# Patient Record
Sex: FEMALE | Race: WHITE | NOT HISPANIC OR LATINO | Employment: STUDENT | URBAN - METROPOLITAN AREA
[De-identification: names, ages, dates, MRNs, and addresses within clinical notes are randomized per-mention and may not be internally consistent; named-entity substitution may affect disease eponyms.]

---

## 2017-01-22 ENCOUNTER — OFFICE VISIT (OUTPATIENT)
Dept: URGENT CARE | Facility: CLINIC | Age: 22
End: 2017-01-22
Payer: COMMERCIAL

## 2017-01-22 VITALS
RESPIRATION RATE: 16 BRPM | OXYGEN SATURATION: 95 % | BODY MASS INDEX: 20.83 KG/M2 | SYSTOLIC BLOOD PRESSURE: 102 MMHG | HEART RATE: 96 BPM | WEIGHT: 125 LBS | TEMPERATURE: 97.6 F | DIASTOLIC BLOOD PRESSURE: 60 MMHG | HEIGHT: 65 IN

## 2017-01-22 DIAGNOSIS — J03.90 TONSILLITIS: ICD-10-CM

## 2017-01-22 DIAGNOSIS — J40 BRONCHITIS: ICD-10-CM

## 2017-01-22 LAB
INT CON NEG: NEGATIVE
INT CON POS: POSITIVE
S PYO AG THROAT QL: POSITIVE

## 2017-01-22 PROCEDURE — 99203 OFFICE O/P NEW LOW 30 MIN: CPT | Performed by: PHYSICIAN ASSISTANT

## 2017-01-22 PROCEDURE — 87880 STREP A ASSAY W/OPTIC: CPT | Performed by: PHYSICIAN ASSISTANT

## 2017-01-22 RX ORDER — CEFUROXIME AXETIL 500 MG/1
500 TABLET ORAL 2 TIMES DAILY
Qty: 20 TAB | Refills: 0 | Status: SHIPPED | OUTPATIENT
Start: 2017-01-22 | End: 2017-02-01

## 2017-01-22 RX ORDER — ESCITALOPRAM OXALATE 20 MG/1
20 TABLET ORAL DAILY
COMMUNITY
End: 2018-01-03 | Stop reason: SDUPTHER

## 2017-01-22 ASSESSMENT — ENCOUNTER SYMPTOMS
SORE THROAT: 1
SHORTNESS OF BREATH: 0
RHINORRHEA: 1
FEVER: 1
COUGH: 1
SPUTUM PRODUCTION: 1
EYES NEGATIVE: 1
CARDIOVASCULAR NEGATIVE: 1

## 2017-01-22 NOTE — MR AVS SNAPSHOT
"Chiqui Hardy   2017 9:30 AM   Office Visit   MRN: [id number]    Department:  [facility]   Dept Phone:  [phone]    Description:  Female : 1995   Provider:  Roscoe Cooley PA-C           Reason for Visit     Pharyngitis     Cough           Allergies as of 2017     No Known Allergies      You were diagnosed with     Tonsillitis   [018356]       Bronchitis   [010173]         Vital Signs     Blood Pressure Pulse Temperature Respirations Height Weight    102/60 mmHg 96 36.4 °C (97.6 °F) 16 1.651 m (5' 5\") 56.7 kg (125 lb)    Body Mass Index Oxygen Saturation Breastfeeding?             20.80 kg/m2 95% No         Basic Information     Date Of Birth Sex Race Ethnicity Preferred Language    1995 Female White Non- English      Health Maintenance     Patient has no pending health maintenance at this time      Results     POCT Rapid Strep A      Component    Rapid Strep Screen    Positive    Internal Control Positive    Positive    Internal Control Negative    Negative                        Current Immunizations     No immunizations on file.      Below and/or attached are the medications your provider expects you to take. Review all of your home medications and newly ordered medications with your provider and/or pharmacist. Follow medication instructions as directed by your provider and/or pharmacist. Please keep your medication list with you and share with your provider. Update the information when medications are discontinued, doses are changed, or new medications (including over-the-counter products) are added; and carry medication information at all times in the event of emergency situations     Allergies:  No Known Allergies          Medications  Valid as of: 2017 - 10:06 AM    Generic Name Brand Name Tablet Size Instructions for use    Cefuroxime Axetil (Tab) CEFTIN 500 MG Take 1 Tab by mouth 2 times a day for 10 days.        Escitalopram Oxalate (Tab) " LEXAPRO 20 MG Take 20 mg by mouth every day.        .                 Medicines prescribed today were sent to:     [id number]  [place], [place] - [address] AT [facility] [address]    [address] [place] [address]    Phone: [phone] Fax: [phone]    Open 24 Hours?: No      Medication refill instructions:       If your prescription bottle indicates you have medication refills left, it is not necessary to call your provider’s office. Please contact your pharmacy and they will refill your medication.    If your prescription bottle indicates you do not have any refills left, you may request refills at any time through one of the following ways: The online Friday system (except Urgent Care), by calling your provider’s office, or by asking your pharmacy to contact your provider’s office with a refill request. Medication refills are processed only during regular business hours and may not be available until the next business day. Your provider may request additional information or to have a follow-up visit with you prior to refilling your medication.   *Please Note: Medication refills are assigned a new Rx number when refilled electronically. Your pharmacy may indicate that no refills were authorized even though a new prescription for the same medication is available at the pharmacy. Please request the medicine by name with the pharmacy before contacting your provider for a refill.           Friday Access Code: [email or web address]  Expires: 2/21/2017  9:25 AM    Friday  A secure, online tool to manage your health information     [employer]’s Friday® is a secure, online tool that connects you to your personalized health information from the privacy of your home -- day or night - making it very easy for you to manage your healthcare. Once the activation process is completed, you can even access your medical information using the Friday thor, which is available for free in the  Apple Markie store or Google Play store.     Power[employer]ine provides the following levels of access (as shown below):   My Chart Features   [facility] Doctor [facility]  Specialists [facility]  Urgent  Care Non-[facility]  Primary Care  Doctor   Email your healthcare team securely and privately 24/7 X X X    Manage appointments: schedule your next appointment; view details of past/upcoming appointments X      Request prescription refills. X      View recent personal medical records, including lab and immunizations X X X X   View health record, including health history, allergies, medications X X X X   Read reports about your outpatient visits, procedures, consult and ER notes X X X X   See your discharge summary, which is a recap of your hospital and/or ER visit that includes your diagnosis, lab results, and care plan. X X       How to register for PowerDsine:  1. Go to  [email or web address].  2. Click on the Sign Up Now box, which takes you to the New Member Sign Up page. You will need to provide the following information:  a. Enter your PowerDsine Access Code exactly as it appears at the top of this page. (You will not need to use this code after you’ve completed the sign-up process. If you do not sign up before the expiration date, you must request a new code.)   b. Enter your date of birth.   c. Enter your home email address.   d. Click Submit, and follow the next screen’s instructions.  3. Create a PowerDsine ID. This will be your PowerDsine login ID and cannot be changed, so think of one that is secure and easy to remember.  4. Create a PowerDsine password. You can change your password at any time.  5. Enter your Password Reset Question and Answer. This can be used at a later time if you forget your password.   6. Enter your e-mail address. This allows you to receive e-mail notifications when new information is available in PowerDsine.  7. Click Sign Up. You can now view your health information.    For assistance activating your  MyChart account, call [phone]

## 2017-01-22 NOTE — PROGRESS NOTES
"Subjective:      Chiqui Hardy is a 21 y.o. female who presents with No chief complaint on file.            Cough  This is a new (fever, cough, st) problem. The current episode started in the past 7 days. The problem has been unchanged. The problem occurs every few minutes. The cough is productive of sputum. Associated symptoms include a fever, nasal congestion, rhinorrhea and a sore throat. Pertinent negatives include no shortness of breath. Nothing aggravates the symptoms. She has tried nothing for the symptoms. The treatment provided no relief. There is no history of asthma or environmental allergies.       Review of Systems   Constitutional: Positive for fever.   HENT: Positive for congestion, rhinorrhea and sore throat.    Eyes: Negative.    Respiratory: Positive for cough and sputum production. Negative for shortness of breath.    Cardiovascular: Negative.    Skin: Negative.    Endo/Heme/Allergies: Negative for environmental allergies.          Objective:     /60 mmHg  Pulse 96  Temp(Src) 36.4 °C (97.6 °F)  Resp 16  Ht 1.651 m (5' 5\")  Wt 56.7 kg (125 lb)  BMI 20.80 kg/m2  SpO2 95%  Breastfeeding? No     Physical Exam   Constitutional: She is oriented to person, place, and time. She appears well-developed and well-nourished. No distress.   HENT:   Head: Normocephalic and atraumatic.   Mouth/Throat: No oropharyngeal exudate (+tons redn).   Eyes: EOM are normal. Pupils are equal, round, and reactive to light.   Neck: Normal range of motion. Neck supple.   Cardiovascular: Normal rate.    Pulmonary/Chest: Effort normal and breath sounds normal. No respiratory distress. She has no wheezes. She has no rales.   Lymphadenopathy:     She has cervical adenopathy.   Neurological: She is alert and oriented to person, place, and time.   Skin: Skin is warm and dry.   Psychiatric: She has a normal mood and affect. Her behavior is normal. Judgment and thought content normal.   Nursing note and vitals " "reviewed.    Filed Vitals:    01/22/17 0953   BP: 102/60   Pulse: 96   Temp: 36.4 °C (97.6 °F)   Resp: 16   Height: 1.651 m (5' 5\")   Weight: 56.7 kg (125 lb)   SpO2: 95%     Active Ambulatory Problems     Diagnosis Date Noted   • No Active Ambulatory Problems     Resolved Ambulatory Problems     Diagnosis Date Noted   • No Resolved Ambulatory Problems     No Additional Past Medical History     No current outpatient prescriptions on file prior to visit.     No current facility-administered medications on file prior to visit.     Gargles, Cepacol lozenges, Aleve/Advil as needed for throat pain  History reviewed. No pertinent family history.  Review of patient's allergies indicates no known allergies.         Rst+     Assessment/Plan:     ·  tonsillitis, bronchitis      Gargles, Cepacol lozenges, Aleve/Advil as needed for throat pain; rx abx; Return to clinic 3-5 days if symptoms persist or worsen or follow up with Primary Doctor      "

## 2018-01-03 ENCOUNTER — OFFICE VISIT (OUTPATIENT)
Dept: MEDICAL GROUP | Facility: MEDICAL CENTER | Age: 23
End: 2018-01-03
Payer: COMMERCIAL

## 2018-01-03 VITALS
RESPIRATION RATE: 16 BRPM | WEIGHT: 124 LBS | TEMPERATURE: 98.6 F | HEIGHT: 65 IN | OXYGEN SATURATION: 98 % | BODY MASS INDEX: 20.66 KG/M2 | SYSTOLIC BLOOD PRESSURE: 104 MMHG | HEART RATE: 76 BPM | DIASTOLIC BLOOD PRESSURE: 64 MMHG

## 2018-01-03 DIAGNOSIS — F90.2 ATTENTION DEFICIT HYPERACTIVITY DISORDER (ADHD), COMBINED TYPE: ICD-10-CM

## 2018-01-03 DIAGNOSIS — Z00.00 PREVENTATIVE HEALTH CARE: ICD-10-CM

## 2018-01-03 DIAGNOSIS — F90.2 ATTENTION DEFICIT HYPERACTIVITY DISORDER (ADHD), COMBINED TYPE: Primary | ICD-10-CM

## 2018-01-03 DIAGNOSIS — B00.9 HSV INFECTION: ICD-10-CM

## 2018-01-03 DIAGNOSIS — L70.0 ACNE VULGARIS: ICD-10-CM

## 2018-01-03 DIAGNOSIS — F41.9 ANXIETY: ICD-10-CM

## 2018-01-03 PROCEDURE — 99214 OFFICE O/P EST MOD 30 MIN: CPT | Performed by: FAMILY MEDICINE

## 2018-01-03 RX ORDER — ESCITALOPRAM OXALATE 20 MG/1
30 TABLET ORAL DAILY
Qty: 90 TAB | Refills: 3 | Status: SHIPPED | OUTPATIENT
Start: 2018-01-03 | End: 2018-08-29 | Stop reason: SDUPTHER

## 2018-01-03 RX ORDER — DEXTROAMPHETAMINE SACCHARATE, AMPHETAMINE ASPARTATE, DEXTROAMPHETAMINE SULFATE AND AMPHETAMINE SULFATE 2.5; 2.5; 2.5; 2.5 MG/1; MG/1; MG/1; MG/1
TABLET ORAL
Qty: 60 TAB | Refills: 0 | Status: SHIPPED | OUTPATIENT
Start: 2018-01-03 | End: 2018-01-03 | Stop reason: SDUPTHER

## 2018-01-03 RX ORDER — ACYCLOVIR 400 MG/1
TABLET ORAL
Qty: 30 TAB | Status: SHIPPED | DISCHARGE
Start: 2018-01-03 | End: 2018-08-29 | Stop reason: SDUPTHER

## 2018-01-03 RX ORDER — DEXTROAMPHETAMINE SACCHARATE, AMPHETAMINE ASPARTATE, DEXTROAMPHETAMINE SULFATE AND AMPHETAMINE SULFATE 2.5; 2.5; 2.5; 2.5 MG/1; MG/1; MG/1; MG/1
TABLET ORAL
Qty: 60 TAB | Refills: 0 | Status: SHIPPED | OUTPATIENT
Start: 2018-01-03 | End: 2018-04-19 | Stop reason: SDUPTHER

## 2018-01-03 RX ORDER — SPIRONOLACTONE 25 MG/1
25 TABLET ORAL DAILY
COMMUNITY

## 2018-01-03 ASSESSMENT — PATIENT HEALTH QUESTIONNAIRE - PHQ9: CLINICAL INTERPRETATION OF PHQ2 SCORE: 0

## 2018-01-03 NOTE — ASSESSMENT & PLAN NOTE
"Was dx in elementary school and then re-diagnosed by her school counselor  She actually saw a specialist (psychiatrist) who offered Wellbutrin and adderall but wants second opinion by primary care.  We discuss multiple treatment options and ultimately we decide best first step is to do trial of stimulent medication.  Will start low dose per her request.  We discuss risk/benefit.  We discuss drug holiday.  She will likely only use adderall on Monday Wednesday (her 8hour + class days) and take \"holiday\" during her clinicals (where she does not experience symptoms or dysfunction).      Follow up visit for next controlled substance refill  She can take advantage of our telemedicine appointments  Follow up prn for change in medications, or to follow up on how this plan is working for her  We can consider more frequent counseling and we can consider wellbutrin augmentation in the future                "

## 2018-01-03 NOTE — ASSESSMENT & PLAN NOTE
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

## 2018-01-03 NOTE — PROGRESS NOTES
This medical record contains text that has been entered with the assistance of computer voice recognition and dictation software.  Therefore, it may contain unintended errors in text, spelling, punctuation, or grammar        Chief Complaint   Patient presents with   • Establish Care   • Other     general labs ordered and refill on medications       Chiqui Hardy is a 22 y.o. 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     • spironolactone (ALDACTONE) 25 MG Tab Take 25 mg by mouth every day.     • escitalopram (LEXAPRO) 20 MG tablet Take 1.5 Tabs by mouth every day. 90 Tab 3   • acyclovir (ZOVIRAX) 400 MG tablet Take 1 tablet 3x daily for 5 days as needed for HSV outbreak 30 Tab    • amphetamine-dextroamphetamine (ADDERALL) 10 MG Tab Take 1 tab twice daily (morning and early afternoon) daily for ADHD.  I recommend that you take weekend or weekday holiday at least 2 days per week. 60 Tab 0     No current facility-administered medications for this visit.      Patient Active Problem List    Diagnosis Date Noted   • Anxiety 01/03/2018   • Attention deficit hyperactivity disorder (ADHD), combined type 01/03/2018   • HSV infection 01/03/2018     Past Surgical History:   Procedure Laterality Date   • OTHER  2006    Spinal Tap DX: Spinal infection      Social History   Substance Use Topics   • Smoking status: Never Smoker   •  "Smokeless tobacco: Never Used   • Alcohol use 1.8 oz/week     3 Glasses of wine per week     Family History   Problem Relation Age of Onset   • Other Mother      Hypotension   • Hyperlipidemia Mother    • Hyperlipidemia Father    • Hypertension Father            ROS    See HPI  No lesions labial  + ADHD + stress/anxieity school related   All other systems reviewed and are negative     Objective:     Blood pressure 104/64, pulse 76, temperature 37 °C (98.6 °F), resp. rate 16, height 1.651 m (5' 5\"), weight 56.2 kg (124 lb), SpO2 98 %. Body mass index is 20.63 kg/m².  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  We discuss multiple treatment options and ultimately we decide best first step is to do trial of stimulent medication.  Will start low dose per her request.  We discuss risk/benefit.  We discuss drug holiday.  She will likely only use adderall on Monday " "Wednesday (her 8hour + class days) and take \"holiday\" during her clinicals (where she does not experience symptoms or dysfunction).      Follow up visit for next controlled substance refill  She can take advantage of our telemedicine appointments  Follow up prn for change in medications, or to follow up on how this plan is working for her  We can consider more frequent counseling and we can consider wellbutrin augmentation in the future                       Relevant Medications    amphetamine-dextroamphetamine (ADDERALL) 10 MG Tab    HSV infection     History only  All questions answered  acyclovir sent for in case of recurrence symptoms               Relevant Medications    acyclovir (ZOVIRAX) 400 MG tablet      Other Visit Diagnoses     Acne vulgaris        Preventative health care        Relevant Orders    HEMOGLOBIN A1C            Over 60 minutes spent with patient face to face, greater than 50% time spent with plan/cordination of care as above in my A/P.      Instructed to follow up if symptoms worsen or fail to improve, ER/UC precautions discussed as well    Adelia Macedo MD  [facility], Family Medicine   [address] [place] [address] [address]kw[address]   [place] [address]  Phone: [phone]             "

## 2018-04-16 ENCOUNTER — APPOINTMENT (OUTPATIENT)
Dept: RADIOLOGY | Facility: MEDICAL CENTER | Age: 23
End: 2018-04-16
Attending: EMERGENCY MEDICINE
Payer: COMMERCIAL

## 2018-04-16 ENCOUNTER — HOSPITAL ENCOUNTER (EMERGENCY)
Facility: MEDICAL CENTER | Age: 23
End: 2018-04-16
Attending: EMERGENCY MEDICINE
Payer: COMMERCIAL

## 2018-04-16 VITALS
OXYGEN SATURATION: 99 % | HEART RATE: 57 BPM | TEMPERATURE: 97.1 F | RESPIRATION RATE: 18 BRPM | HEIGHT: 65 IN | SYSTOLIC BLOOD PRESSURE: 106 MMHG | BODY MASS INDEX: 21.67 KG/M2 | DIASTOLIC BLOOD PRESSURE: 60 MMHG | WEIGHT: 130.07 LBS

## 2018-04-16 DIAGNOSIS — G44.59 OTHER COMPLICATED HEADACHE SYNDROME: ICD-10-CM

## 2018-04-16 LAB
ALBUMIN SERPL BCP-MCNC: 4 G/DL (ref 3.2–4.9)
ALBUMIN/GLOB SERPL: 1.5 G/DL
ALP SERPL-CCNC: 54 U/L (ref 30–99)
ALT SERPL-CCNC: 18 U/L (ref 2–50)
ANION GAP SERPL CALC-SCNC: 6 MMOL/L (ref 0–11.9)
APTT PPP: 32 SEC (ref 24.7–36)
AST SERPL-CCNC: 20 U/L (ref 12–45)
BASOPHILS # BLD AUTO: 0.5 % (ref 0–1.8)
BASOPHILS # BLD: 0.03 K/UL (ref 0–0.12)
BILIRUB SERPL-MCNC: 0.8 MG/DL (ref 0.1–1.5)
BUN SERPL-MCNC: 18 MG/DL (ref 8–22)
CALCIUM SERPL-MCNC: 8.7 MG/DL (ref 8.5–10.5)
CHLORIDE SERPL-SCNC: 106 MMOL/L (ref 96–112)
CO2 SERPL-SCNC: 27 MMOL/L (ref 20–33)
CREAT SERPL-MCNC: 0.69 MG/DL (ref 0.5–1.4)
EOSINOPHIL # BLD AUTO: 0.07 K/UL (ref 0–0.51)
EOSINOPHIL NFR BLD: 1.1 % (ref 0–6.9)
ERYTHROCYTE [DISTWIDTH] IN BLOOD BY AUTOMATED COUNT: 42 FL (ref 35.9–50)
ERYTHROCYTE [SEDIMENTATION RATE] IN BLOOD BY WESTERGREN METHOD: 14 MM/HOUR (ref 0–20)
GLOBULIN SER CALC-MCNC: 2.6 G/DL (ref 1.9–3.5)
GLUCOSE SERPL-MCNC: 82 MG/DL (ref 65–99)
HCT VFR BLD AUTO: 40.3 % (ref 37–47)
HGB BLD-MCNC: 13.2 G/DL (ref 12–16)
IMM GRANULOCYTES # BLD AUTO: 0.01 K/UL (ref 0–0.11)
IMM GRANULOCYTES NFR BLD AUTO: 0.2 % (ref 0–0.9)
INR PPP: 1.03 (ref 0.87–1.13)
LYMPHOCYTES # BLD AUTO: 1.94 K/UL (ref 1–4.8)
LYMPHOCYTES NFR BLD: 29.7 % (ref 22–41)
MCH RBC QN AUTO: 30.6 PG (ref 27–33)
MCHC RBC AUTO-ENTMCNC: 32.8 G/DL (ref 33.6–35)
MCV RBC AUTO: 93.5 FL (ref 81.4–97.8)
MONOCYTES # BLD AUTO: 0.86 K/UL (ref 0–0.85)
MONOCYTES NFR BLD AUTO: 13.2 % (ref 0–13.4)
NEUTROPHILS # BLD AUTO: 3.62 K/UL (ref 2–7.15)
NEUTROPHILS NFR BLD: 55.3 % (ref 44–72)
NRBC # BLD AUTO: 0 K/UL
NRBC BLD-RTO: 0 /100 WBC
PLATELET # BLD AUTO: 207 K/UL (ref 164–446)
PMV BLD AUTO: 10.2 FL (ref 9–12.9)
POTASSIUM SERPL-SCNC: 3.4 MMOL/L (ref 3.6–5.5)
PROT SERPL-MCNC: 6.6 G/DL (ref 6–8.2)
PROTHROMBIN TIME: 13.2 SEC (ref 12–14.6)
RBC # BLD AUTO: 4.31 M/UL (ref 4.2–5.4)
SODIUM SERPL-SCNC: 139 MMOL/L (ref 135–145)
WBC # BLD AUTO: 6.5 K/UL (ref 4.8–10.8)

## 2018-04-16 PROCEDURE — 80053 COMPREHEN METABOLIC PANEL: CPT

## 2018-04-16 PROCEDURE — A9270 NON-COVERED ITEM OR SERVICE: HCPCS | Performed by: EMERGENCY MEDICINE

## 2018-04-16 PROCEDURE — 99284 EMERGENCY DEPT VISIT MOD MDM: CPT

## 2018-04-16 PROCEDURE — 700111 HCHG RX REV CODE 636 W/ 250 OVERRIDE (IP): Performed by: EMERGENCY MEDICINE

## 2018-04-16 PROCEDURE — 70450 CT HEAD/BRAIN W/O DYE: CPT

## 2018-04-16 PROCEDURE — 85652 RBC SED RATE AUTOMATED: CPT

## 2018-04-16 PROCEDURE — 85730 THROMBOPLASTIN TIME PARTIAL: CPT

## 2018-04-16 PROCEDURE — 700102 HCHG RX REV CODE 250 W/ 637 OVERRIDE(OP): Performed by: EMERGENCY MEDICINE

## 2018-04-16 PROCEDURE — 85025 COMPLETE CBC W/AUTO DIFF WBC: CPT

## 2018-04-16 PROCEDURE — 85610 PROTHROMBIN TIME: CPT

## 2018-04-16 RX ORDER — ONDANSETRON 4 MG/1
4 TABLET, ORALLY DISINTEGRATING ORAL ONCE
Status: COMPLETED | OUTPATIENT
Start: 2018-04-16 | End: 2018-04-16

## 2018-04-16 RX ORDER — OXYCODONE HYDROCHLORIDE AND ACETAMINOPHEN 5; 325 MG/1; MG/1
1 TABLET ORAL ONCE
Status: COMPLETED | OUTPATIENT
Start: 2018-04-16 | End: 2018-04-16

## 2018-04-16 RX ADMIN — ONDANSETRON 4 MG: 4 TABLET, ORALLY DISINTEGRATING ORAL at 12:50

## 2018-04-16 RX ADMIN — OXYCODONE HYDROCHLORIDE AND ACETAMINOPHEN 1 TABLET: 5; 325 TABLET ORAL at 12:50

## 2018-04-16 ASSESSMENT — PAIN SCALES - GENERAL
PAINLEVEL_OUTOF10: 2
PAINLEVEL_OUTOF10: 6

## 2018-04-16 NOTE — DISCHARGE INSTRUCTIONS
General Headache Without Cause  A headache is pain or discomfort felt around the head or neck area. The specific cause of a headache may not be found. There are many causes and types of headaches. A few common ones are:  · Tension headaches.  · Migraine headaches.  · Cluster headaches.  · Chronic daily headaches.  Follow these instructions at home:  Watch your condition for any changes. Take these steps to help with your condition:  Managing pain  · Take over-the-counter and prescription medicines only as told by your health care provider.  · Lie down in a dark, quiet room when you have a headache.  · If directed, apply ice to the head and neck area:  ¨ Put ice in a plastic bag.  ¨ Place a towel between your skin and the bag.  ¨ Leave the ice on for 20 minutes, 2-3 times per day.  · Use a heating pad or hot shower to apply heat to the head and neck area as told by your health care provider.  · Keep lights dim if bright lights bother you or make your headaches worse.  Eating and drinking  · Eat meals on a regular schedule.  · Limit alcohol use.  · Decrease the amount of caffeine you drink, or stop drinking caffeine.  General instructions  · Keep all follow-up visits as told by your health care provider. This is important.  · Keep a headache journal to help find out what may trigger your headaches. For example, write down:  ¨ What you eat and drink.  ¨ How much sleep you get.  ¨ Any change to your diet or medicines.  · Try massage or other relaxation techniques.  · Limit stress.  · Sit up straight, and do not tense your muscles.  · Do not use tobacco products, including cigarettes, chewing tobacco, or e-cigarettes. If you need help quitting, ask your health care provider.  · Exercise regularly as told by your health care provider.  · Sleep on a regular schedule. Get 7-9 hours of sleep, or the amount recommended by your health care provider.  Contact a health care provider if:  · Your symptoms are not helped by  medicine.  · You have a headache that is different from the usual headache.  · You have nausea or you vomit.  · You have a fever.  Get help right away if:  · Your headache becomes severe.  · You have repeated vomiting.  · You have a stiff neck.  · You have a loss of vision.  · You have problems with speech.  · You have pain in the eye or ear.  · You have muscular weakness or loss of muscle control.  · You lose your balance or have trouble walking.  · You feel faint or pass out.  · You have confusion.  This information is not intended to replace advice given to you by your health care provider. Make sure you discuss any questions you have with your health care provider.  Document Released: 12/18/2006 Document Revised: 05/25/2017 Document Reviewed: 04/11/2016  Elsevier Interactive Patient Education © 2017 [employer] Inc.

## 2018-04-16 NOTE — ED NOTES
Dismissed per pedis.  Ambulates with steady gait.  She verbalizes understanding of her DC instructions.

## 2018-04-16 NOTE — ED TRIAGE NOTES
Pt amb to triage.  Chief Complaint   Patient presents with   • Low Back Pain   • Leg Pain     bilat, R>L   • Headache

## 2018-04-16 NOTE — ED PROVIDER NOTES
"ED Provider Note    CHIEF COMPLAINT  Chief Complaint   Patient presents with   • Low Back Pain   • Leg Pain     bilat, R>L   • Headache       HPI  Chiqui Hardy is a 22 y.o. female who presents for evaluation of a mild to moderate bifrontal headache associated with some general aches and pains and some lower back pain. She is concerned because when she was 11 years old she had back pain and she had some type of spine infection which required several days in the hospital. She is a student at [employer]. She's been up and around. She has history of migraines. Has a history of anxiety disorder. She has a history of spondylolisthesis    REVIEW OF SYSTEMS  No chest pain or difficulty breathing no abdominal pain. She's had a good appetite.  ALL OTHER SYSTEMS NEGATIVE    ALLERGIES  No Known Allergies    CURRENT MEDICATIONS  No current medication    PAST MEDICAL HISTORY  Past Medical History:   Diagnosis Date   • Anxiety    • Hyperactive    • Hypoglycemia    • Spondylisthesis     L-4,L-5 spine       SURGICAL HISTORY  Past Surgical History:   Procedure Laterality Date   • OTHER  2006    Spinal Tap DX: Spinal infection       FAMILY HISTORY  Family History   Problem Relation Age of Onset   • Other Mother      Hypotension   • Hyperlipidemia Mother    • Hyperlipidemia Father    • Hypertension Father        SOCIAL HISTORY  Nonsmoker. She was a student at [employer]     PHYSICAL EXAM  GENERAL: Alert smiling healthy-appearing nontoxic female  VITAL SIGNS: /70   Pulse 69   Temp 36.2 °C (97.1 °F)   Resp 18   Ht 1.651 m (5' 5\")   Wt 59 kg (130 lb 1.1 oz)   SpO2 99%   BMI 21.64 kg/m²   Constitutional: Alert healthy-appearing adult   HENT: Scalp is normal size and nontender. Ears are clear. Nose is clear. Throat is clear with no stridor no drooling no trismus. Teeth are all intact.  Eyes: Pupils equal round and reactive to light, extraocular motor fall. There is no scleral icterus.  Neck: Neck is supple and nontender. There is no " meningismus. No adenitis. No thyromegaly.  Lymphatic: No adenopathy.   Cardiovascular: Heart regular rhythm without murmurs or gallops   Thorax & Lungs: No chest wall tenderness. Lungs are clear. Patient has good breath sounds bilateral. No rales, no rhonchi, no wheezes.  Abdomen: Abdomen is soft, nontender, not rigid, no guarding, and no organomegaly. There is no palpable hernia   Skin: Warm, pink, and dry with no erythema and no rash.   Back: Nontender, no midline bony tenderness, no flank tenderness.                                             Extremities: Full range of motion  No tenderness to palpation and no deformities noted. No calf or thigh swelling. No calf or thigh tenderness. No clinical DVT.  Neurologic: Alert & oriented . Cranial nerves are grossly intact as tested. Patient moves all 4 extremities well. Patient has good strong flexion and extension of the ankle joints knee joints hip joints and elbow joints. Sensation is normal and symmetrical in the upper and lower extremities.   Psychiatric: Patient is alert oriented coherent and rational.    RADIOLOGY/PROCEDURES  CT-HEAD W/O   Final Result      No intracranial mass effect or acute hemorrhage.            COURSE & MEDICAL DECISION MAKING  Is young healthy appearing female with normal vital signs are complains of a headache and some general aches and pains and back pain. She sustained student at the [employer]. She was concerned because at age 11 she had some type of spine infection. Certainly looks healthy and nontoxic at this time. Vital signs are normal. Because of her past history we'll do an evaluation.    Plan: #1 CT of the head #2 lab evaluation including CBC, CMP, etc.    Laboratory and reexamination: CT the head is normal. CBC is normal with no anemia and no bandemia. Chemistry panel is normal. No renal or liver dysfunction.    Results for orders placed or performed during the hospital encounter of 04/16/18   CBC WITH DIFFERENTIAL    Result Value Ref Range    WBC 6.5 4.8 - 10.8 K/uL    RBC 4.31 4.20 - 5.40 M/uL    Hemoglobin 13.2 12.0 - 16.0 g/dL    Hematocrit 40.3 37.0 - 47.0 %    MCV 93.5 81.4 - 97.8 fL    MCH 30.6 27.0 - 33.0 pg    MCHC 32.8 (L) 33.6 - 35.0 g/dL    RDW 42.0 35.9 - 50.0 fL    Platelet Count 207 164 - 446 K/uL    MPV 10.2 9.0 - 12.9 fL    Neutrophils-Polys 55.30 44.00 - 72.00 %    Lymphocytes 29.70 22.00 - 41.00 %    Monocytes 13.20 0.00 - 13.40 %    Eosinophils 1.10 0.00 - 6.90 %    Basophils 0.50 0.00 - 1.80 %    Immature Granulocytes 0.20 0.00 - 0.90 %    Nucleated RBC 0.00 /100 WBC    Neutrophils (Absolute) 3.62 2.00 - 7.15 K/uL    Lymphs (Absolute) 1.94 1.00 - 4.80 K/uL    Monos (Absolute) 0.86 (H) 0.00 - 0.85 K/uL    Eos (Absolute) 0.07 0.00 - 0.51 K/uL    Baso (Absolute) 0.03 0.00 - 0.12 K/uL    Immature Granulocytes (abs) 0.01 0.00 - 0.11 K/uL    NRBC (Absolute) 0.00 K/uL   COMP METABOLIC PANEL   Result Value Ref Range    Sodium 139 135 - 145 mmol/L    Potassium 3.4 (L) 3.6 - 5.5 mmol/L    Chloride 106 96 - 112 mmol/L    Co2 27 20 - 33 mmol/L    Anion Gap 6.0 0.0 - 11.9    Glucose 82 65 - 99 mg/dL    Bun 18 8 - 22 mg/dL    Creatinine 0.69 0.50 - 1.40 mg/dL    Calcium 8.7 8.5 - 10.5 mg/dL    AST(SGOT) 20 12 - 45 U/L    ALT(SGPT) 18 2 - 50 U/L    Alkaline Phosphatase 54 30 - 99 U/L    Total Bilirubin 0.8 0.1 - 1.5 mg/dL    Albumin 4.0 3.2 - 4.9 g/dL    Total Protein 6.6 6.0 - 8.2 g/dL    Globulin 2.6 1.9 - 3.5 g/dL    A-G Ratio 1.5 g/dL   PROTHROMBIN TIME   Result Value Ref Range    PT 13.2 12.0 - 14.6 sec    INR 1.03 0.87 - 1.13   APTT   Result Value Ref Range    APTT 32.0 24.7 - 36.0 sec   ESTIMATED GFR   Result Value Ref Range    GFR If African American >60 >60 mL/min/1.73 m 2    GFR If Non African American >60 >60 mL/min/1.73 m 2      Patient is alert and smiling, comfortable, nontoxic. CT the head is normal. Laboratory evaluation is normal. She may have migraines. She responded well to medication here. She'll  follow-up at the [facility] or a family physician here in [place].    Home treatment: #1 she been given a copy of all of her x-ray and lab reports. #2 she follow-up with her family physician. Stable for discharge.  FINAL IMPRESSION  1. Migraine headache  2. Lumbar back pain         Electronically signed by: Gary Gansert, 4/16/2018 2:20 PM

## 2018-04-19 ENCOUNTER — TELEPHONE (OUTPATIENT)
Dept: MEDICAL GROUP | Facility: MEDICAL CENTER | Age: 23
End: 2018-04-19

## 2018-04-19 ENCOUNTER — OFFICE VISIT (OUTPATIENT)
Dept: MEDICAL GROUP | Facility: MEDICAL CENTER | Age: 23
End: 2018-04-19
Payer: COMMERCIAL

## 2018-04-19 VITALS
BODY MASS INDEX: 21.83 KG/M2 | OXYGEN SATURATION: 96 % | SYSTOLIC BLOOD PRESSURE: 98 MMHG | WEIGHT: 131 LBS | TEMPERATURE: 99 F | HEIGHT: 65 IN | DIASTOLIC BLOOD PRESSURE: 60 MMHG | HEART RATE: 58 BPM

## 2018-04-19 DIAGNOSIS — Z86.69 HISTORY OF CHIARI MALFORMATION: ICD-10-CM

## 2018-04-19 DIAGNOSIS — F90.2 ATTENTION DEFICIT HYPERACTIVITY DISORDER (ADHD), COMBINED TYPE: ICD-10-CM

## 2018-04-19 DIAGNOSIS — R51.9 NONINTRACTABLE HEADACHE, UNSPECIFIED CHRONICITY PATTERN, UNSPECIFIED HEADACHE TYPE: ICD-10-CM

## 2018-04-19 DIAGNOSIS — M43.10 SPONDYLOLISTHESIS, UNSPECIFIED SPINAL REGION: ICD-10-CM

## 2018-04-19 DIAGNOSIS — R90.89 ABNORMAL BRAIN MRI: ICD-10-CM

## 2018-04-19 PROCEDURE — 99214 OFFICE O/P EST MOD 30 MIN: CPT | Performed by: PHYSICIAN ASSISTANT

## 2018-04-19 RX ORDER — DEXTROAMPHETAMINE SACCHARATE, AMPHETAMINE ASPARTATE, DEXTROAMPHETAMINE SULFATE AND AMPHETAMINE SULFATE 2.5; 2.5; 2.5; 2.5 MG/1; MG/1; MG/1; MG/1
TABLET ORAL
Qty: 60 TAB | Refills: 0 | Status: SHIPPED | OUTPATIENT
Start: 2018-04-19 | End: 2018-06-13 | Stop reason: SDUPTHER

## 2018-04-19 RX ORDER — DEXTROAMPHETAMINE SACCHARATE, AMPHETAMINE ASPARTATE, DEXTROAMPHETAMINE SULFATE AND AMPHETAMINE SULFATE 2.5; 2.5; 2.5; 2.5 MG/1; MG/1; MG/1; MG/1
TABLET ORAL
Qty: 60 TAB | Refills: 0 | Status: SHIPPED
Start: 2018-04-19 | End: 2018-04-19 | Stop reason: SDUPTHER

## 2018-04-19 NOTE — PROGRESS NOTES
"Chief Complaint   Patient presents with   • Hospital Follow-up     Headaches, requesting an MRI        HPI  Chiqui Hardy is a 22 y.o. female here today for follow-up on hospital discharge, headaches, spondylosis and neurology referral.  Patient was in the hospital for headache and  body aches and body pains. She had normal labs and brain CT scan. Was diagnosed w migraine headaches and was discharged. States in 2007 she had brain MRI which showed Chiari malformation. She think  she has appointment with neurology however she needs a more recent brain MRI before starting them. Patient also has spondylolisthesis in lumbar area w chronic lower back pain.     Patient has ADHD and has been taking Adderall 10 mg twice daily. States it has helped her greatly in focusing. Denies any SE including wt loss, change in sleep, or fatigue and tiredness. No CP or palpitations.         Past medical, surgical, family, and social history is reviewed in Epic chart by me today.   Medications and allergies reviewed and updated in Epic chart by me today.     ROS:   As documented in history of present illness above    Exam:  Blood pressure (!) 98/60, pulse (!) 58, temperature 37.2 °C (99 °F), height 1.651 m (5' 5\"), weight 59.4 kg (131 lb), SpO2 96 %, not currently breastfeeding.  Constitutional: Alert, no distress, plus 3 vital signs  Skin:  Warm, dry, no rashes invisible areas  Respiratory: Unlabored respiratory effort, lungs clear to auscultation, no wheezes, no rhonchi  Cardiovascular: RRR, no murmur,   Psych: Alert, pleasant, well-groomed, normal affect    I have reviewed the Prescription Monitoring Program () today without any discrepancies      A/P:    1. Abnormal brain MRI    - MR-BRAIN-WITH & W/O; Future    2. History of Chiari malformation    - MR-BRAIN-WITH & W/O; Future    3. Attention deficit hyperactivity disorder (ADHD), combined type    - amphetamine-dextroamphetamine (ADDERALL) 10 MG Tab; Take 1 tab twice daily " (morning and early afternoon) daily for ADHD.  I recommend that you take weekend or weekday holiday at least 2 days per week.  Dispense: 60 Tab; Refill: 0    4. Spondylolisthesis, unspecified spinal region    - MR-LUMBAR SPINE-WITH & W/O; Future  - MR-CERVICAL SPINE-WITH & W/O; Future    5. Nonintractable headache, unspecified chronicity pattern, unspecified headache type    - MR-CERVICAL SPINE-WITH & W/O; Future    F/u prn

## 2018-04-19 NOTE — LETTER
April 19, 2018         Patient: Chiqui Hardy   YOB: 1995   Date of Visit: 4/19/2018           To Whom it May Concern:    Chiqui Hardy was seen in my clinic on 4/19/2018. She may return to school on 4/20/18.    If you have any questions or concerns, please don't hesitate to call.        Sincerely,           Adelia Macedo M.D.  Electronically Signed

## 2018-04-19 NOTE — TELEPHONE ENCOUNTER
1. Caller Name: Chiqui                                         Call Back Number: [phone] (home)       Patient approves a detailed voicemail message: yes    Patient called requesting a letter for school to return back to class.    Please advise, thank you.

## 2018-04-27 ENCOUNTER — HOSPITAL ENCOUNTER (OUTPATIENT)
Dept: RADIOLOGY | Facility: MEDICAL CENTER | Age: 23
End: 2018-04-27
Attending: PHYSICIAN ASSISTANT
Payer: COMMERCIAL

## 2018-04-27 DIAGNOSIS — M43.10 SPONDYLOLISTHESIS, UNSPECIFIED SPINAL REGION: ICD-10-CM

## 2018-04-27 DIAGNOSIS — R90.89 ABNORMAL BRAIN MRI: ICD-10-CM

## 2018-04-27 DIAGNOSIS — Z86.69 HISTORY OF CHIARI MALFORMATION: ICD-10-CM

## 2018-04-27 DIAGNOSIS — R51.9 NONINTRACTABLE HEADACHE, UNSPECIFIED CHRONICITY PATTERN, UNSPECIFIED HEADACHE TYPE: ICD-10-CM

## 2018-04-27 PROCEDURE — 70553 MRI BRAIN STEM W/O & W/DYE: CPT

## 2018-04-27 PROCEDURE — 700117 HCHG RX CONTRAST REV CODE 255: Performed by: PHYSICIAN ASSISTANT

## 2018-04-27 PROCEDURE — 72156 MRI NECK SPINE W/O & W/DYE: CPT

## 2018-04-27 PROCEDURE — 72158 MRI LUMBAR SPINE W/O & W/DYE: CPT

## 2018-04-27 PROCEDURE — A9579 GAD-BASE MR CONTRAST NOS,1ML: HCPCS | Performed by: PHYSICIAN ASSISTANT

## 2018-04-27 RX ADMIN — GADODIAMIDE 15 ML: 287 INJECTION INTRAVENOUS at 09:31

## 2018-05-25 DIAGNOSIS — F90.2 ATTENTION DEFICIT HYPERACTIVITY DISORDER (ADHD), COMBINED TYPE: ICD-10-CM

## 2018-05-25 RX ORDER — DEXTROAMPHETAMINE SACCHARATE, AMPHETAMINE ASPARTATE, DEXTROAMPHETAMINE SULFATE AND AMPHETAMINE SULFATE 2.5; 2.5; 2.5; 2.5 MG/1; MG/1; MG/1; MG/1
TABLET ORAL
Qty: 60 TAB | Refills: 0 | OUTPATIENT
Start: 2018-05-25 | End: 2019-05-25

## 2018-05-25 NOTE — TELEPHONE ENCOUNTER
Was the patient seen in the last year in this department? Yes    Pt saw Zee 4/19/2018    last filled 4/19/2018 #60 tabs  Does patient have an active prescription for medications requested? No   Received Request Via: Patient

## 2018-06-05 NOTE — TELEPHONE ENCOUNTER
Pt asked if Dr. Macedo would give her a prescription to hold her until her appointment in July?   I left pt a message to schedule an office visit with one of our other PA's. This medication needs an office visit.

## 2018-06-12 ENCOUNTER — HOSPITAL ENCOUNTER (OUTPATIENT)
Dept: RADIOLOGY | Facility: MEDICAL CENTER | Age: 23
End: 2018-06-12
Attending: NEUROLOGICAL SURGERY
Payer: COMMERCIAL

## 2018-06-12 DIAGNOSIS — M43.16 SPONDYLOLISTHESIS OF LUMBAR REGION: ICD-10-CM

## 2018-06-12 PROCEDURE — 72131 CT LUMBAR SPINE W/O DYE: CPT

## 2018-06-12 PROCEDURE — 72110 X-RAY EXAM L-2 SPINE 4/>VWS: CPT

## 2018-06-13 ENCOUNTER — OFFICE VISIT (OUTPATIENT)
Dept: MEDICAL GROUP | Facility: MEDICAL CENTER | Age: 23
End: 2018-06-13
Payer: COMMERCIAL

## 2018-06-13 VITALS
HEIGHT: 65 IN | BODY MASS INDEX: 21.33 KG/M2 | WEIGHT: 128 LBS | SYSTOLIC BLOOD PRESSURE: 112 MMHG | DIASTOLIC BLOOD PRESSURE: 60 MMHG | HEART RATE: 74 BPM | TEMPERATURE: 98 F | OXYGEN SATURATION: 94 % | RESPIRATION RATE: 16 BRPM

## 2018-06-13 DIAGNOSIS — G43.109 MIGRAINE WITH AURA AND WITHOUT STATUS MIGRAINOSUS, NOT INTRACTABLE: ICD-10-CM

## 2018-06-13 DIAGNOSIS — G93.5 CHIARI MALFORMATION TYPE I (HCC): ICD-10-CM

## 2018-06-13 DIAGNOSIS — F90.2 ATTENTION DEFICIT HYPERACTIVITY DISORDER (ADHD), COMBINED TYPE: ICD-10-CM

## 2018-06-13 PROCEDURE — 99214 OFFICE O/P EST MOD 30 MIN: CPT | Performed by: FAMILY MEDICINE

## 2018-06-13 RX ORDER — DEXTROAMPHETAMINE SACCHARATE, AMPHETAMINE ASPARTATE, DEXTROAMPHETAMINE SULFATE AND AMPHETAMINE SULFATE 2.5; 2.5; 2.5; 2.5 MG/1; MG/1; MG/1; MG/1
TABLET ORAL
Qty: 60 TAB | Refills: 0 | Status: SHIPPED | OUTPATIENT
Start: 2018-08-13 | End: 2018-08-29 | Stop reason: SDUPTHER

## 2018-06-13 RX ORDER — DEXTROAMPHETAMINE SACCHARATE, AMPHETAMINE ASPARTATE, DEXTROAMPHETAMINE SULFATE AND AMPHETAMINE SULFATE 2.5; 2.5; 2.5; 2.5 MG/1; MG/1; MG/1; MG/1
TABLET ORAL
Qty: 60 TAB | Refills: 0 | Status: SHIPPED | OUTPATIENT
Start: 2018-07-13 | End: 2018-06-13 | Stop reason: SDUPTHER

## 2018-06-13 RX ORDER — SUMATRIPTAN 25 MG/1
TABLET, FILM COATED ORAL
Qty: 10 TAB | Refills: 11 | Status: SHIPPED | OUTPATIENT
Start: 2018-06-13

## 2018-06-13 RX ORDER — DEXTROAMPHETAMINE SACCHARATE, AMPHETAMINE ASPARTATE, DEXTROAMPHETAMINE SULFATE AND AMPHETAMINE SULFATE 2.5; 2.5; 2.5; 2.5 MG/1; MG/1; MG/1; MG/1
TABLET ORAL
Qty: 60 TAB | Refills: 0 | Status: SHIPPED | OUTPATIENT
Start: 2018-06-13 | End: 2018-06-13 | Stop reason: SDUPTHER

## 2018-06-13 NOTE — ASSESSMENT & PLAN NOTE
new dx migraines (has had 2 since April but lasted for 1 week, trouble aborting with NSAID and otc options)  + family history mom with migraines  Stress as trigger (finals)      Plan: rx triptan abortive, risk benefit side effect discussed  We discuss avoidance of common triggers  We discuss the role of estrogen (she is on progestin only contraceptive)   We discuss the role of  ppx which I do not think are indicated, of note she is taking SNRI which could be slightly helpful although TCA is more traditionally used   I also discuss the possibility that adderall could be a trigger, she will contemplate this and her goal is actually 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

## 2018-06-13 NOTE — ASSESSMENT & PLAN NOTE
"Was dx in elementary school and then re-diagnosed by her school counselor  We discuss risk/benefit stimulent medication.  We discuss drug holiday.  She will likely only use adderall on Monday Wednesday (her 8hour + class days) and take \"holiday\" during her clinicals (where she does not experience symptoms or dysfunction).      Follow up visit for next controlled substance refill  She can take advantage of our telemedicine appointments                        "

## 2018-06-13 NOTE — PROGRESS NOTES
This medical record contains text that has been entered with the assistance of computer voice recognition and dictation software.  Therefore, it may contain unintended errors in text, spelling, punctuation, or grammar        Chief Complaint   Patient presents with   • Follow-Up   • ADHD       Chiqui Hardy is a 22 y.o. 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 She is interested in psych and women's health so she is thinking sex trafficking or providing care to sex workers, or evaluation of rape victims in ER     grew up [place]   born in [facility]  apartmentent with 4 roommates      nexplanon x 3 years  Happy with it  Will switch out in 2 mo with her gynecologist      ADHD   Doing well with adderall low dose  Requesting refill    HSV - no further outbreaks, carry's antiviral just incase    Depression/anxiety - well controlled on lexapro     Two new dx  Went to ER with severe frontal headache with vision changes and nausea  Dx with migraines   And dx with chiari 1  Has had 2 migraines and had trouble aborting with NSAID  Saw neuro surgeon who stated that her chiari was non surgical  She notices that stress Is trigger for migraine   + family history mom     She currently feels well in her usual state of health no headache vision changes focal neuro symptoms nausea or abd pain       Current Outpatient Prescriptions   Medication Sig Dispense Refill   • SUMAtriptan (IMITREX) 25 MG Tab tablet Take 1-4 tabs as needed for migraine 10 Tab 11   • [START ON 8/13/2018] amphetamine-dextroamphetamine (ADDERALL) 10 MG Tab Take 1 tab twice daily (morning and early afternoon) daily for ADHD.  I recommend that you take weekend or weekday holiday  "at least 2 days per week. 60 Tab 0   • spironolactone (ALDACTONE) 25 MG Tab Take 25 mg by mouth every day.     • escitalopram (LEXAPRO) 20 MG tablet Take 1.5 Tabs by mouth every day. 90 Tab 3   • etonogestrel (NEXPLANON) 68 MG Implant implant Inject 1 Each as instructed Once.     • acyclovir (ZOVIRAX) 400 MG tablet Take 1 tablet 3x daily for 5 days as needed for HSV outbreak 30 Tab      No current facility-administered medications for this visit.      Patient Active Problem List    Diagnosis Date Noted   • Chiari malformation type I (HCC) 06/13/2018   • Migraine with aura and without status migrainosus, not intractable 06/13/2018   • Anxiety 01/03/2018   • Attention deficit hyperactivity disorder (ADHD), combined type 01/03/2018   • HSV infection 01/03/2018     Past Surgical History:   Procedure Laterality Date   • OTHER  2006    Spinal Tap DX: Spinal infection      Social History   Substance Use Topics   • Smoking status: Never Smoker   • Smokeless tobacco: Never Used   • Alcohol use 1.8 oz/week     3 Glasses of wine per week     Family History   Problem Relation Age of Onset   • Other Mother      Hypotension   • Hyperlipidemia Mother    • Hyperlipidemia Father    • Hypertension Father            ROS    See HPI  No lesions labial  + ADHD + stress/anxiety school related   All other systems reviewed and are negative     Objective:     Blood pressure 112/60, pulse 74, temperature 36.7 °C (98 °F), resp. rate 16, height 1.651 m (5' 5\"), weight 58.1 kg (128 lb), SpO2 94 %. Body mass index is 21.3 kg/m².  Physical Exam:        GEN: comfortable, alert and oriented, well nourished, well developed, in no apparent distress   HEENT: NCAT, eyes: pupils equal and reactive, sclera white, EOMIT, good dentition  HEART: limbs warm and well perfused, regular rate, no JVD, no lower extremity edema  LUNGS: speaking in full sentences, not in apparent respiratory distress, no audible wheezes  MSK: normal tone and bulk, no swelling of the " "joints, gait steady and normal         Assessment and Plan:   The following treatment plan was discussed        Problem List Items Addressed This Visit     Attention deficit hyperactivity disorder (ADHD), combined type     Was dx in elementary school and then re-diagnosed by her school counselor  We discuss risk/benefit stimulent medication.  We discuss drug holiday.  She will likely only use adderall on Monday Wednesday (her 8hour + class days) and take \"holiday\" during her clinicals (where she does not experience symptoms or dysfunction).      Follow up visit for next controlled substance refill  She can take advantage of our telemedicine appointments                               Relevant Medications    amphetamine-dextroamphetamine (ADDERALL) 10 MG Tab (Start on 8/13/2018)    Chiari malformation type I (HCC)     Est with dr Murillo neurosurgeon with [facility]  Not surgical              Relevant Medications    SUMAtriptan (IMITREX) 25 MG Tab tablet    Other Relevant Orders    REFERRAL TO NEUROLOGY    Migraine with aura and without status migrainosus, not intractable      new dx migraines (has had 2 since April but lasted for 1 week, trouble aborting with NSAID and otc options)  + family history mom with migraines  Stress as trigger (finals)      Plan: rx triptan abortive, risk benefit side effect discussed  We discuss avoidance of common triggers  We discuss the role of estrogen (she is on progestin only contraceptive)   We discuss the role of  ppx which I do not think are indicated, of note she is taking SNRI which could be slightly helpful although TCA is more traditionally used   I also discuss the possibility that adderall could be a trigger, she will contemplate this and her goal is actually 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      Instructed to follow up if symptoms worsen or fail to improve, ER/UC precautions discussed as well    Adelia Macedo MD  [facility], [address]   [place] [address]  Phone: [phone]

## 2018-08-29 ENCOUNTER — OFFICE VISIT (OUTPATIENT)
Dept: MEDICAL GROUP | Facility: MEDICAL CENTER | Age: 23
End: 2018-08-29
Payer: COMMERCIAL

## 2018-08-29 VITALS
BODY MASS INDEX: 21.83 KG/M2 | OXYGEN SATURATION: 95 % | SYSTOLIC BLOOD PRESSURE: 132 MMHG | HEIGHT: 65 IN | DIASTOLIC BLOOD PRESSURE: 72 MMHG | WEIGHT: 131 LBS | RESPIRATION RATE: 14 BRPM | HEART RATE: 69 BPM | TEMPERATURE: 98.4 F

## 2018-08-29 DIAGNOSIS — G43.109 MIGRAINE WITH AURA AND WITHOUT STATUS MIGRAINOSUS, NOT INTRACTABLE: ICD-10-CM

## 2018-08-29 DIAGNOSIS — F90.2 ATTENTION DEFICIT HYPERACTIVITY DISORDER (ADHD), COMBINED TYPE: ICD-10-CM

## 2018-08-29 DIAGNOSIS — B00.9 HSV INFECTION: ICD-10-CM

## 2018-08-29 DIAGNOSIS — L03.90 CELLULITIS, UNSPECIFIED CELLULITIS SITE: ICD-10-CM

## 2018-08-29 DIAGNOSIS — F41.9 ANXIETY: ICD-10-CM

## 2018-08-29 DIAGNOSIS — G47.09 OTHER INSOMNIA: ICD-10-CM

## 2018-08-29 PROCEDURE — 99214 OFFICE O/P EST MOD 30 MIN: CPT | Performed by: FAMILY MEDICINE

## 2018-08-29 RX ORDER — SULFAMETHOXAZOLE AND TRIMETHOPRIM 800; 160 MG/1; MG/1
1 TABLET ORAL 2 TIMES DAILY
Qty: 14 TAB | Refills: 0 | Status: SHIPPED | OUTPATIENT
Start: 2018-08-29 | End: 2018-08-29 | Stop reason: SDUPTHER

## 2018-08-29 RX ORDER — DEXTROAMPHETAMINE SACCHARATE, AMPHETAMINE ASPARTATE, DEXTROAMPHETAMINE SULFATE AND AMPHETAMINE SULFATE 2.5; 2.5; 2.5; 2.5 MG/1; MG/1; MG/1; MG/1
TABLET ORAL
Qty: 60 TAB | Refills: 0 | Status: SHIPPED | OUTPATIENT
Start: 2018-08-29 | End: 2018-08-29 | Stop reason: SDUPTHER

## 2018-08-29 RX ORDER — ACYCLOVIR 400 MG/1
TABLET ORAL
Qty: 30 TAB | Refills: 11 | Status: SHIPPED | OUTPATIENT
Start: 2018-08-29

## 2018-08-29 RX ORDER — ZALEPLON 5 MG/1
CAPSULE ORAL
Qty: 20 CAP | Refills: 2 | Status: SHIPPED | OUTPATIENT
Start: 2018-08-29 | End: 2018-10-29 | Stop reason: SDUPTHER

## 2018-08-29 RX ORDER — ESCITALOPRAM OXALATE 20 MG/1
30 TABLET ORAL DAILY
Qty: 90 TAB | Refills: 3 | Status: SHIPPED | OUTPATIENT
Start: 2018-08-29

## 2018-08-29 RX ORDER — SULFAMETHOXAZOLE AND TRIMETHOPRIM 800; 160 MG/1; MG/1
1 TABLET ORAL 2 TIMES DAILY
Qty: 14 TAB | Refills: 3 | Status: SHIPPED | OUTPATIENT
Start: 2018-08-29 | End: 2018-09-05

## 2018-08-29 RX ORDER — DEXTROAMPHETAMINE SACCHARATE, AMPHETAMINE ASPARTATE, DEXTROAMPHETAMINE SULFATE AND AMPHETAMINE SULFATE 2.5; 2.5; 2.5; 2.5 MG/1; MG/1; MG/1; MG/1
TABLET ORAL
Qty: 60 TAB | Refills: 0 | Status: SHIPPED | OUTPATIENT
Start: 2018-10-29 | End: 2018-10-29 | Stop reason: SDUPTHER

## 2018-08-29 RX ORDER — DEXTROAMPHETAMINE SACCHARATE, AMPHETAMINE ASPARTATE, DEXTROAMPHETAMINE SULFATE AND AMPHETAMINE SULFATE 2.5; 2.5; 2.5; 2.5 MG/1; MG/1; MG/1; MG/1
TABLET ORAL
Qty: 60 TAB | Refills: 0 | Status: SHIPPED | OUTPATIENT
Start: 2018-09-29 | End: 2018-08-29 | Stop reason: SDUPTHER

## 2018-08-29 NOTE — ASSESSMENT & PLAN NOTE
+ family history mom with migraines  Stress as trigger      Plan: rx triptan abortive, risk benefit side effect discussed  We discuss avoidance of common triggers  We discuss the role of estrogen (she is on progestin only contraceptive)   We discuss the role of  ppx which I do not think are indicated, of note she is taking SNRI which could be slightly helpful although TCA is more traditionally used

## 2018-08-29 NOTE — ASSESSMENT & PLAN NOTE
Roommate suicide attempt which has been stressful for marichuy  She is seeing counselor  She is requesting rx for prn use  Risk benefit side effect discussed

## 2018-08-29 NOTE — ASSESSMENT & PLAN NOTE
Was dx in elementary school and then re-diagnosed by her school counselor  She actually saw a specialist (psychiatrist) who offered Wellbutrin and adderall     We discuss risk/benefit.  We discuss drug holiday.    Follow up visit for next controlled substance refill  She can take advantage of our telemedicine appointments

## 2018-09-26 ENCOUNTER — TELEPHONE (OUTPATIENT)
Dept: MEDICAL GROUP | Facility: MEDICAL CENTER | Age: 23
End: 2018-09-26

## 2018-09-26 NOTE — TELEPHONE ENCOUNTER
1. Caller Name: Chiqui Hardy                                                 Call Back Number: [phone] (home)         Patient approves a detailed voicemail message: yes    Pt. Called yesterday stating her migraine medication wasn't working. So I called her back and scheduled her an appointment.

## 2018-10-08 ENCOUNTER — APPOINTMENT (OUTPATIENT)
Dept: MEDICAL GROUP | Facility: MEDICAL CENTER | Age: 23
End: 2018-10-08
Payer: COMMERCIAL

## 2018-10-24 ENCOUNTER — APPOINTMENT (OUTPATIENT)
Dept: MEDICAL GROUP | Facility: MEDICAL CENTER | Age: 23
End: 2018-10-24
Payer: COMMERCIAL

## 2018-10-29 ENCOUNTER — OFFICE VISIT (OUTPATIENT)
Dept: MEDICAL GROUP | Facility: MEDICAL CENTER | Age: 23
End: 2018-10-29
Payer: COMMERCIAL

## 2018-10-29 VITALS
TEMPERATURE: 98.4 F | SYSTOLIC BLOOD PRESSURE: 100 MMHG | OXYGEN SATURATION: 99 % | BODY MASS INDEX: 21.33 KG/M2 | RESPIRATION RATE: 16 BRPM | HEIGHT: 65 IN | HEART RATE: 64 BPM | DIASTOLIC BLOOD PRESSURE: 62 MMHG | WEIGHT: 128 LBS

## 2018-10-29 DIAGNOSIS — G47.09 OTHER INSOMNIA: ICD-10-CM

## 2018-10-29 DIAGNOSIS — F41.9 ANXIETY: ICD-10-CM

## 2018-10-29 DIAGNOSIS — F90.2 ATTENTION DEFICIT HYPERACTIVITY DISORDER (ADHD), COMBINED TYPE: ICD-10-CM

## 2018-10-29 DIAGNOSIS — Z30.46 ENCOUNTER FOR SURVEILLANCE OF IMPLANTABLE SUBDERMAL CONTRACEPTIVE: ICD-10-CM

## 2018-10-29 DIAGNOSIS — G43.109 MIGRAINE WITH AURA AND WITHOUT STATUS MIGRAINOSUS, NOT INTRACTABLE: ICD-10-CM

## 2018-10-29 PROCEDURE — 99214 OFFICE O/P EST MOD 30 MIN: CPT | Performed by: FAMILY MEDICINE

## 2018-10-29 RX ORDER — DEXTROAMPHETAMINE SACCHARATE, AMPHETAMINE ASPARTATE, DEXTROAMPHETAMINE SULFATE AND AMPHETAMINE SULFATE 2.5; 2.5; 2.5; 2.5 MG/1; MG/1; MG/1; MG/1
TABLET ORAL
Qty: 60 TAB | Refills: 0 | Status: SHIPPED | OUTPATIENT
Start: 2018-12-29 | End: 2019-06-29

## 2018-10-29 RX ORDER — DEXTROAMPHETAMINE SACCHARATE, AMPHETAMINE ASPARTATE, DEXTROAMPHETAMINE SULFATE AND AMPHETAMINE SULFATE 2.5; 2.5; 2.5; 2.5 MG/1; MG/1; MG/1; MG/1
TABLET ORAL
Qty: 60 TAB | Refills: 0 | Status: SHIPPED | OUTPATIENT
Start: 2018-11-29 | End: 2018-10-29 | Stop reason: SDUPTHER

## 2018-10-29 RX ORDER — DEXTROAMPHETAMINE SACCHARATE, AMPHETAMINE ASPARTATE, DEXTROAMPHETAMINE SULFATE AND AMPHETAMINE SULFATE 2.5; 2.5; 2.5; 2.5 MG/1; MG/1; MG/1; MG/1
TABLET ORAL
Qty: 60 TAB | Refills: 0 | Status: SHIPPED | OUTPATIENT
Start: 2018-10-29 | End: 2018-10-29 | Stop reason: SDUPTHER

## 2018-10-29 RX ORDER — ZALEPLON 10 MG/1
CAPSULE ORAL
Qty: 30 CAP | Refills: 2 | Status: SHIPPED | OUTPATIENT
Start: 2018-10-29 | End: 2018-12-29

## 2018-10-29 NOTE — PROGRESS NOTES
This medical record contains text that has been entered with the assistance of computer voice recognition and dictation software.  Therefore, it may contain unintended errors in text, spelling, punctuation, or grammar        Chief Complaint   Patient presents with   • Other     needs new migraine med; imitrex not working       Chiqui Agatha Hardy is a 22 y.o. 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 She is interested in psych and women's health so she is thinking sex trafficking or providing care to sex workers, or evaluation of rape victims in ER     g[place] up [place]   born in [facility] with 4 roommates      nexplanon   Happy with it  recently switch out with gynocologist      ADHD   Doing well with adderall low dose  Requesting refill    HSV - no further outbreaks, carry's antiviral just incase    Depression/anxiety - well controlled on lexapro     Rel new dx migraines   Went to ER with severe frontal headache with vision changes and nausea  Dx with migraines   And dx with chiari 1   Has had 2 migraines and had trouble aborting with NSAID  Saw neuro surgeon who stated that her chiari was non surgical  She notices that stress Is trigger for migraine   + family history mom     She currently feels well in her usual state of health no headache vision changes focal neuro symptoms nausea or abd pain       Current Outpatient Prescriptions   Medication Sig Dispense Refill   • [START ON 12/29/2018] amphetamine-dextroamphetamine (ADDERALL) 10 MG Tab Take 1 tab twice daily (morning and early afternoon) daily for ADHD.  I recommend that you take weekend or weekday holiday at least 2 days per week. 60 Tab 0   • zaleplon (SONATA) 5 MG  "capsule Take 1-2 tabs at night as needed for sleep 20 Cap 2   • escitalopram (LEXAPRO) 20 MG tablet Take 1.5 Tabs by mouth every day. 90 Tab 3   • SUMAtriptan (IMITREX) 25 MG Tab tablet Take 1-4 tabs as needed for migraine 10 Tab 11   • etonogestrel (NEXPLANON) 68 MG Implant implant Inject 1 Each as instructed Once.     • spironolactone (ALDACTONE) 25 MG Tab Take 25 mg by mouth every day.     • acyclovir (ZOVIRAX) 400 MG tablet Take 1 tablet 3x daily for 5 days as needed for HSV outbreak 30 Tab 11     No current facility-administered medications for this visit.      Patient Active Problem List    Diagnosis Date Noted   • Encounter for surveillance of implantable subdermal contraceptive 10/29/2018   • Other insomnia 08/29/2018   • Chiari malformation type I (HCC) 06/13/2018   • Migraine with aura and without status migrainosus, not intractable 06/13/2018   • Anxiety 01/03/2018   • Attention deficit hyperactivity disorder (ADHD), combined type 01/03/2018   • HSV infection 01/03/2018     Past Surgical History:   Procedure Laterality Date   • OTHER  2006    Spinal Tap DX: Spinal infection      Social History   Substance Use Topics   • Smoking status: Never Smoker   • Smokeless tobacco: Never Used   • Alcohol use 1.8 oz/week     3 Glasses of wine per week     Family History   Problem Relation Age of Onset   • Other Mother         Hypotension   • Hyperlipidemia Mother    • Hyperlipidemia Father    • Hypertension Father            ROS    See HPI  No lesions labial  + ADHD + stress/anxiety school related   All other systems reviewed and are negative     Objective:     Blood pressure 100/62, pulse 64, temperature 36.9 °C (98.4 °F), temperature source Temporal, resp. rate 16, height 1.651 m (5' 5\"), weight 58.1 kg (128 lb), SpO2 99 %, not currently breastfeeding. Body mass index is 21.3 kg/m².  Physical Exam:        GEN: comfortable, alert and oriented, well nourished, well developed, in no apparent distress   HEENT: NCAT, " eyes: pupils equal and reactive, sclera white, EOMIT, good dentition  HEART: limbs warm and well perfused, regular rate, no JVD, no lower extremity edema  LUNGS: speaking in full sentences, not in apparent respiratory distress, no audible wheezes  MSK: normal tone and bulk, no swelling of the joints, gait steady and normal         Assessment and Plan:   The following treatment plan was discussed        Problem List Items Addressed This Visit     Anxiety     Well controlled on lexapro             Attention deficit hyperactivity disorder (ADHD), combined type     Refill of adderall provided risk benefit side effect discussed             Relevant Medications    amphetamine-dextroamphetamine (ADDERALL) 10 MG Tab (Start on 12/29/2018)    Migraine with aura and without status migrainosus, not intractable     I review dosing of imitrex   1-4 tabs x1 for migraine, may repeat x 1 (max 200mg / 24 hours)   We are avoiding estrogen contraception             Encounter for surveillance of implantable subdermal contraceptive     All quesitons answered      Est as they related to migraines                        Instructed to follow up if symptoms worsen or fail to improve, ER/UC precautions discussed as well    Adelia Macedo MD  [facility], Family Medicine   [address] [place] [address]   [place] [address]  Phone: [phone]

## 2018-10-29 NOTE — ASSESSMENT & PLAN NOTE
I review dosing of imitrex   1-4 tabs x1 for migraine, may repeat x 1 (max 200mg / 24 hours)   We are avoiding estrogen contraception

## 2019-01-17 ENCOUNTER — TELEPHONE (OUTPATIENT)
Dept: MEDICAL GROUP | Facility: MEDICAL CENTER | Age: 24
End: 2019-01-17

## 2019-01-17 NOTE — TELEPHONE ENCOUNTER
Phone Number Called: [phone] (home)     Message: LVM for pt. To call back. [place] and wanted to confirm her medication for Aderral was written on 10/29/2018. I checked and confirmed it. I left a voicemail for Pt. To confirm if she has moved for the time being.    Left Message for patient to call back: yes

## 2019-01-18 NOTE — TELEPHONE ENCOUNTER
Phone Number Called: [phone] (home)     Message: Spoke with Pt. And she confirmed [facility] [address] in [facility]. She confirmed it was her filling the prescription.    Left Message for patient to call back: N\A

## 2023-01-25 NOTE — LETTER
I called and spoke with the patient letting her know that her medications have been sent to the pharmacy. I also helped her get scheduled for a Medication Check on 02/27/2023 with Velma Salinas at 11:30am. I told the patient to arrive at 11:10am for the appointment.    Madyson Greene [facility]     [facility]  Adelia Macedo M.D.  [address]  [place] [address]  Fax: [phone]   Authorization for Release/Disclosure of   Protected Health Information   Name: KRYS DOUGLAS LYNN : 1995 SSN: [id number]   Address:   [facility] [place] [address] Phone:    [phone] (home)    I authorize the entity listed below to release/disclose the PHI below to:   [facility]/Adelia Macedo M.D.    Provider or Entity Name:     Address   City, State, [facility]   Phone:[phone]    Fax:[phone]   Reason for request: continuity of care   Information to be released:    [  ] LAST COLONOSCOPY,  including any PATH REPORT and follow-up  [  ] LAST FIT/COLOGUARD RESULT [  ] LAST DEXA  [  ] LAST MAMMOGRAM  [  ] LAST PAP  [  ] LAST LABS [  ] RETINA EXAM REPORT  [  ] IMMUNIZATION RECORDS  [  ] Release all info      [  ] Check here and initial the line next to each item to release ALL health information INCLUDING  _____ Care and treatment for drug and / or alcohol abuse  _____ HIV testing, infection status, or AIDS  _____ Genetic Testing    DATES OF SERVICE OR TIME PERIOD TO BE DISCLOSED: _____________  I understand and acknowledge that:  * This Authorization may be revoked at any time by you in writing, except if your health information has already been used or disclosed.  * Your health information that will be used or disclosed as a result of you signing this authorization could be re-disclosed by the recipient. If this occurs, your re-disclosed health information may no longer be protected by State or Federal laws.  * You may refuse to sign this Authorization. Your refusal will not affect your ability to obtain treatment.  * This Authorization becomes effective upon signing and will  on (date) __________.      If no date is indicated, this Authorization will  one (1) year from the signature date.    Name: Krys Maciasord    Signature:   Date:     1/3/2018       PLEASE FAX REQUESTED RECORDS BACK TO:  [phone]